# Patient Record
Sex: MALE | Race: WHITE | Employment: FULL TIME | URBAN - METROPOLITAN AREA
[De-identification: names, ages, dates, MRNs, and addresses within clinical notes are randomized per-mention and may not be internally consistent; named-entity substitution may affect disease eponyms.]

---

## 2022-11-14 ENCOUNTER — HOSPITAL ENCOUNTER (EMERGENCY)
Age: 22
Discharge: HOME | End: 2022-11-14
Attending: EMERGENCY MEDICINE
Payer: COMMERCIAL

## 2022-11-14 ENCOUNTER — APPOINTMENT (OUTPATIENT)
Dept: GENERAL RADIOLOGY | Age: 22
End: 2022-11-14
Payer: COMMERCIAL

## 2022-11-14 VITALS
HEIGHT: 71 IN | WEIGHT: 213 LBS | OXYGEN SATURATION: 96 % | SYSTOLIC BLOOD PRESSURE: 116 MMHG | TEMPERATURE: 98.8 F | RESPIRATION RATE: 20 BRPM | DIASTOLIC BLOOD PRESSURE: 72 MMHG | BODY MASS INDEX: 29.82 KG/M2 | HEART RATE: 108 BPM

## 2022-11-14 DIAGNOSIS — J45.901 REACTIVE AIRWAY DISEASE WITH ACUTE EXACERBATION, UNSPECIFIED ASTHMA SEVERITY, UNSPECIFIED WHETHER PERSISTENT: ICD-10-CM

## 2022-11-14 DIAGNOSIS — T78.40XA ALLERGIC REACTION, INITIAL ENCOUNTER: Primary | ICD-10-CM

## 2022-11-14 PROCEDURE — 96372 THER/PROPH/DIAG INJ SC/IM: CPT

## 2022-11-14 PROCEDURE — 6360000002 HC RX W HCPCS: Performed by: EMERGENCY MEDICINE

## 2022-11-14 PROCEDURE — 6370000000 HC RX 637 (ALT 250 FOR IP): Performed by: EMERGENCY MEDICINE

## 2022-11-14 PROCEDURE — 99284 EMERGENCY DEPT VISIT MOD MDM: CPT

## 2022-11-14 PROCEDURE — 71045 X-RAY EXAM CHEST 1 VIEW: CPT

## 2022-11-14 RX ORDER — IPRATROPIUM BROMIDE AND ALBUTEROL SULFATE 2.5; .5 MG/3ML; MG/3ML
1 SOLUTION RESPIRATORY (INHALATION) ONCE
Status: COMPLETED | OUTPATIENT
Start: 2022-11-14 | End: 2022-11-14

## 2022-11-14 RX ORDER — DEXAMETHASONE SODIUM PHOSPHATE 10 MG/ML
10 INJECTION, SOLUTION INTRAMUSCULAR; INTRAVENOUS ONCE
Status: COMPLETED | OUTPATIENT
Start: 2022-11-14 | End: 2022-11-14

## 2022-11-14 RX ORDER — EPINEPHRINE 0.3 MG/.3ML
0.3 INJECTION SUBCUTANEOUS ONCE
Qty: 0.3 ML | Refills: 1 | Status: SHIPPED | OUTPATIENT
Start: 2022-11-14 | End: 2022-11-14

## 2022-11-14 RX ORDER — PREDNISONE 10 MG/1
50 TABLET ORAL DAILY
Qty: 25 TABLET | Refills: 0 | Status: SHIPPED | OUTPATIENT
Start: 2022-11-14 | End: 2022-11-19

## 2022-11-14 RX ORDER — DIPHENHYDRAMINE HYDROCHLORIDE 50 MG/ML
50 INJECTION INTRAMUSCULAR; INTRAVENOUS ONCE
Status: COMPLETED | OUTPATIENT
Start: 2022-11-14 | End: 2022-11-14

## 2022-11-14 RX ORDER — DIPHENHYDRAMINE HCL 25 MG
25-50 CAPSULE ORAL EVERY 4 HOURS PRN
Qty: 20 CAPSULE | Refills: 0 | Status: SHIPPED | OUTPATIENT
Start: 2022-11-14 | End: 2022-11-24

## 2022-11-14 RX ORDER — ALBUTEROL SULFATE 90 UG/1
2 AEROSOL, METERED RESPIRATORY (INHALATION) 4 TIMES DAILY PRN
Qty: 18 G | Refills: 0 | Status: SHIPPED | OUTPATIENT
Start: 2022-11-14

## 2022-11-14 RX ADMIN — DIPHENHYDRAMINE HYDROCHLORIDE 50 MG: 50 INJECTION, SOLUTION INTRAMUSCULAR; INTRAVENOUS at 05:17

## 2022-11-14 RX ADMIN — DEXAMETHASONE SODIUM PHOSPHATE 10 MG: 10 INJECTION, SOLUTION INTRAMUSCULAR; INTRAVENOUS at 05:17

## 2022-11-14 RX ADMIN — EPINEPHRINE 0.3 MG: 1 INJECTION INTRAMUSCULAR; INTRAVENOUS; SUBCUTANEOUS at 05:18

## 2022-11-14 RX ADMIN — IPRATROPIUM BROMIDE AND ALBUTEROL SULFATE 1 AMPULE: 2.5; .5 SOLUTION RESPIRATORY (INHALATION) at 05:17

## 2022-11-14 ASSESSMENT — PAIN - FUNCTIONAL ASSESSMENT: PAIN_FUNCTIONAL_ASSESSMENT: NONE - DENIES PAIN

## 2022-11-14 NOTE — ED NOTES
Pt dc home with instructions to  rx from pharmacy. PT verbalized understanding on when to return to ED.      Megan Torres RN  11/14/22 9996

## 2022-11-14 NOTE — ED PROVIDER NOTES
Emergency Physician Note        Note Open Time: 6:17 AM EST    Chief Complaint  Allergic Reaction, Cough, and Shortness of Breath       History of Present Illness  Claudine Donnelly is a 23 y.o. male who presents to the ED for allergic reaction. Patient reports for the last few days he has had a dry cough and some mild congestion. He has been told in the past that he may have asthma. He states that tonight he ate food at a sushi place and a few hours later began to break out with a rash and his shortness of breath got worse which led to his presentation here. He states he has had once in the past is severe reaction requiring an EpiPen and does not currently have an EpiPen. He does not feel like his throat is closing at all. 10 systems reviewed, pertinent positives per HPI otherwise noted to be negative    I have reviewed the following from the nursing documentation:      Prior to Admission medications    Medication Sig Start Date End Date Taking? Authorizing Provider   lurasidone (LATUDA) 40 MG TABS tablet Take  by mouth daily. Historical Provider, MD   FLUoxetine (PROZAC) 10 MG capsule Take 10 mg by mouth daily. Historical Provider, MD       Allergies as of 11/14/2022 - Review Complete 11/18/2014   Allergen Reaction Noted    Shellfish-derived products Hives 11/14/2022       History reviewed. No pertinent past medical history. Surgical History:   Past Surgical History:   Procedure Laterality Date    APPENDECTOMY          Family History:  History reviewed. No pertinent family history.     Social History     Socioeconomic History    Marital status: Single     Spouse name: Not on file    Number of children: Not on file    Years of education: Not on file    Highest education level: Not on file   Occupational History    Not on file   Tobacco Use    Smoking status: Never    Smokeless tobacco: Not on file   Substance and Sexual Activity    Alcohol use: Yes     Comment: occ    Drug use: Never    Sexual activity: Never   Other Topics Concern    Not on file   Social History Narrative    Not on file     Social Determinants of Health     Financial Resource Strain: Not on file   Food Insecurity: Not on file   Transportation Needs: Not on file   Physical Activity: Not on file   Stress: Not on file   Social Connections: Not on file   Intimate Partner Violence: Not on file   Housing Stability: Not on file       Nursing notes reviewed. ED Triage Vitals   Enc Vitals Group      BP 11/14/22 0459 (!) 150/94      Heart Rate 11/14/22 0459 (!) 116      Resp 11/14/22 0459 20      Temp 11/14/22 0502 98.8 °F (37.1 °C)      Temp Source 11/14/22 0502 Oral      SpO2 11/14/22 0459 96 %      Weight 11/14/22 0503 213 lb (96.6 kg)      Height 11/14/22 0503 5' 11\" (1.803 m)      Head Circumference --       Peak Flow --       Pain Score --       Pain Loc --       Pain Edu? --       Excl. in GC? --        GENERAL:  Awake, alert. Well developed, well nourished with no apparent distress. HENT:  Normocephalic, Atraumatic, moist mucous membranes. No tongue, lip or uvular edema. No stridor. EYES:  Pupils equal round and reactive to light, Conjunctiva normal, extraocular movements normal.  NECK:  No meningeal signs, Supple. CHEST:  Regular rate and rhythm, chest wall non-tender. LUNGS: Occasional wheeze but otherwise clear to auscultation bilaterally. ABDOMEN:  Soft, non-tender, no rebound, rigidity or guarding, non-distended, normal bowel sounds. No costovertebral angle tenderness to palpation. BACK:  No tenderness. EXTREMITIES:  Normal range of motion, no edema, no bony tenderness, no deformity, distal pulses present. SKIN: Warm, dry and intact. Diffuse urticarial rash on the trunk and bilateral upper and lower extremities. NEUROLOGIC: Normal mental status. Moving all extremities to command. RADIOLOGY  X-RAYS:  I have reviewed radiologic plain film image(s).   ALL OTHER NON-PLAIN FILM IMAGES SUCH AS CT, ULTRASOUND AND MRI HAVE BEEN READ BY THE RADIOLOGIST. XR CHEST PORTABLE   Final Result   Mildly prominent peribronchial markings seen within the right lung base which   may be related to acute bronchiolitis. No consolidation, effusion, or other acute process. MEDICAL DECISION MAKING    ED medications:   Medications   dexamethasone (PF) (DECADRON) injection 10 mg (10 mg IntraMUSCular Given 11/14/22 0517)   diphenhydrAMINE (BENADRYL) injection 50 mg (50 mg IntraMUSCular Given 11/14/22 0517)   EPINEPHrine 1 MG/ML injection 0.3 mg (0.3 mg IntraMUSCular Given 11/14/22 0518)   ipratropium-albuterol (DUONEB) nebulizer solution 1 ampule (1 ampule Inhalation Given 11/14/22 0517)     Patient requested that we not place an IV unless absolutely necessary due to his anxiety. I will route this request and we provided the appropriate medicines intramuscularly. After receiving these medicines the patient had significant improvement. He stated subjectively they felt better and his lung exam was improved. His rash also. Was intense and based on his clinical improvement I am discharging him. I am providing him with steroids for the next few days as well as an EpiPen and advised him that I believe he does have reactive airways/asthma. The total Critical Care time is 30 minutes which excludes separately billable procedures. The critical care was concerning treatment of allergic reaction with steroids, epinephrine and antihistamine medications. This time is exclusive of any time documented by any other providers. I advised the patient to return to the emergency department immediately for any new or worsening symptoms, such as chest pain or shortness of breath. The patient voiced agreement and understanding of the treatment plan. No results found for this visit on 11/14/22.     I estimate there is LOW risk for AIRWAY COMPROMISE, CELLULITIS, EPIGLOTTITIS, or NECROTIZING FASCIITIS, thus I consider the discharge disposition reasonable. Also, there is no evidence or peritonitis, sepsis, or toxicity. Duane Dowse and I have discussed the diagnosis and risks, and we agree with discharging home to follow-up with their primary doctor. We also discussed returning to the Emergency Department immediately if new or worsening symptoms occur. We have discussed the symptoms which are most concerning (e.g., bloody stool, fever, changing or worsening pain, vomiting) that necessitate immediate return. FINAL Impression  1. Allergic reaction, initial encounter    2. Reactive airway disease with acute exacerbation, unspecified asthma severity, unspecified whether persistent        Blood pressure 116/72, pulse (!) 108, temperature 98.8 °F (37.1 °C), temperature source Oral, resp. rate 20, height 5' 11\" (1.803 m), weight 213 lb (96.6 kg), SpO2 96 %. Patient was given scripts for the following medications. I counseled patient how to take these medications. New Prescriptions    ALBUTEROL SULFATE HFA (VENTOLIN HFA) 108 (90 BASE) MCG/ACT INHALER    Inhale 2 puffs into the lungs 4 times daily as needed for Wheezing    DIPHENHYDRAMINE (BENADRYL) 25 MG CAPSULE    Take 1-2 capsules by mouth every 4 hours as needed for Itching or Allergies    EPINEPHRINE (EPIPEN 2-AP) 0.3 MG/0.3ML SOAJ INJECTION    Inject 0.3 mLs into the muscle once for 1 dose Use as directed for allergic reaction with difficulty breathing or any unusual sensation in your mouth or throat. Call 911 at the same time. PREDNISONE (DELTASONE) 10 MG TABLET    Take 5 tablets by mouth daily for 5 days       Disposition  Pt is in good condition upon Discharge to home. This chart was generated using the [address] [place], [place] [address]Th  [employer] system. I created this record but it may contain dictation errors.           Satinder Coffey MD  11/14/22 0135